# Patient Record
Sex: FEMALE | Race: WHITE | HISPANIC OR LATINO
[De-identification: names, ages, dates, MRNs, and addresses within clinical notes are randomized per-mention and may not be internally consistent; named-entity substitution may affect disease eponyms.]

---

## 2017-05-03 ENCOUNTER — APPOINTMENT (OUTPATIENT)
Dept: FAMILY MEDICINE | Facility: HOSPITAL | Age: 37
End: 2017-05-03

## 2017-05-03 ENCOUNTER — RESULT CHARGE (OUTPATIENT)
Age: 37
End: 2017-05-03

## 2017-05-03 ENCOUNTER — OUTPATIENT (OUTPATIENT)
Dept: OUTPATIENT SERVICES | Facility: HOSPITAL | Age: 37
LOS: 1 days | End: 2017-05-03
Payer: SELF-PAY

## 2017-05-03 VITALS
TEMPERATURE: 98.1 F | DIASTOLIC BLOOD PRESSURE: 88 MMHG | RESPIRATION RATE: 14 BRPM | OXYGEN SATURATION: 100 % | HEIGHT: 61 IN | WEIGHT: 156 LBS | HEART RATE: 73 BPM | SYSTOLIC BLOOD PRESSURE: 129 MMHG | BODY MASS INDEX: 29.45 KG/M2

## 2017-05-03 DIAGNOSIS — Z00.00 ENCOUNTER FOR GENERAL ADULT MEDICAL EXAMINATION W/OUT ABNORMAL FINDINGS: ICD-10-CM

## 2017-05-03 DIAGNOSIS — E66.3 OVERWEIGHT: ICD-10-CM

## 2017-05-03 LAB
BILIRUB UR QL STRIP: NORMAL
CLARITY UR: CLEAR
COLLECTION METHOD: NORMAL
GLUCOSE UR-MCNC: NORMAL
HCG UR QL: 0.2 EU/DL
HGB UR QL STRIP.AUTO: NORMAL
KETONES UR-MCNC: NORMAL
LEUKOCYTE ESTERASE UR QL STRIP: NORMAL
NITRITE UR QL STRIP: NORMAL
PH UR STRIP: 6.5
PROT UR STRIP-MCNC: NORMAL
SP GR UR STRIP: 1.01

## 2017-05-06 PROCEDURE — 80061 LIPID PANEL: CPT

## 2017-05-06 PROCEDURE — 36415 COLL VENOUS BLD VENIPUNCTURE: CPT

## 2017-05-06 PROCEDURE — 85025 COMPLETE CBC W/AUTO DIFF WBC: CPT

## 2017-05-06 PROCEDURE — 83036 HEMOGLOBIN GLYCOSYLATED A1C: CPT

## 2017-05-06 PROCEDURE — G0463: CPT

## 2017-05-06 PROCEDURE — 80053 COMPREHEN METABOLIC PANEL: CPT

## 2017-05-20 ENCOUNTER — APPOINTMENT (OUTPATIENT)
Dept: FAMILY MEDICINE | Facility: HOSPITAL | Age: 37
End: 2017-05-20

## 2017-05-20 ENCOUNTER — OUTPATIENT (OUTPATIENT)
Dept: OUTPATIENT SERVICES | Facility: HOSPITAL | Age: 37
LOS: 1 days | End: 2017-05-20
Payer: SELF-PAY

## 2017-05-20 VITALS
WEIGHT: 156 LBS | TEMPERATURE: 97.8 F | SYSTOLIC BLOOD PRESSURE: 112 MMHG | BODY MASS INDEX: 29.45 KG/M2 | DIASTOLIC BLOOD PRESSURE: 88 MMHG | RESPIRATION RATE: 14 BRPM | HEART RATE: 96 BPM | OXYGEN SATURATION: 100 % | HEIGHT: 61 IN

## 2017-05-20 DIAGNOSIS — Z01.419 ENCOUNTER FOR GYNECOLOGICAL EXAMINATION (GENERAL) (ROUTINE) W/OUT ABNORMAL FINDINGS: ICD-10-CM

## 2017-05-20 PROCEDURE — G0101: CPT

## 2017-05-20 PROCEDURE — 87491 CHLMYD TRACH DNA AMP PROBE: CPT

## 2017-05-20 PROCEDURE — 87563 M. GENITALIUM AMP PROBE: CPT

## 2017-05-20 PROCEDURE — 87591 N.GONORRHOEAE DNA AMP PROB: CPT

## 2017-05-20 PROCEDURE — G0463: CPT

## 2017-05-20 PROCEDURE — 87798 DETECT AGENT NOS DNA AMP: CPT

## 2017-05-20 PROCEDURE — 87801 DETECT AGNT MULT DNA AMPLI: CPT

## 2019-06-05 ENCOUNTER — RESULT REVIEW (OUTPATIENT)
Age: 39
End: 2019-06-05

## 2019-06-17 ENCOUNTER — RESULT REVIEW (OUTPATIENT)
Age: 39
End: 2019-06-17

## 2019-07-08 ENCOUNTER — APPOINTMENT (OUTPATIENT)
Dept: ULTRASOUND IMAGING | Facility: HOSPITAL | Age: 39
End: 2019-07-08
Payer: COMMERCIAL

## 2019-07-08 ENCOUNTER — APPOINTMENT (OUTPATIENT)
Dept: MAMMOGRAPHY | Facility: HOSPITAL | Age: 39
End: 2019-07-08
Payer: COMMERCIAL

## 2019-07-08 ENCOUNTER — OUTPATIENT (OUTPATIENT)
Dept: OUTPATIENT SERVICES | Facility: HOSPITAL | Age: 39
LOS: 1 days | End: 2019-07-08
Payer: COMMERCIAL

## 2019-07-08 DIAGNOSIS — Z12.31 ENCOUNTER FOR SCREENING MAMMOGRAM FOR MALIGNANT NEOPLASM OF BREAST: ICD-10-CM

## 2019-07-08 DIAGNOSIS — Z00.8 ENCOUNTER FOR OTHER GENERAL EXAMINATION: ICD-10-CM

## 2019-07-08 PROCEDURE — 77066 DX MAMMO INCL CAD BI: CPT

## 2019-07-08 PROCEDURE — 76830 TRANSVAGINAL US NON-OB: CPT

## 2019-07-08 PROCEDURE — 76856 US EXAM PELVIC COMPLETE: CPT | Mod: 26

## 2019-07-08 PROCEDURE — 76830 TRANSVAGINAL US NON-OB: CPT | Mod: 26

## 2019-07-08 PROCEDURE — 76856 US EXAM PELVIC COMPLETE: CPT

## 2019-07-08 PROCEDURE — 76641 ULTRASOUND BREAST COMPLETE: CPT

## 2019-07-08 PROCEDURE — 77066 DX MAMMO INCL CAD BI: CPT | Mod: 26,GG

## 2019-07-08 PROCEDURE — 76641 ULTRASOUND BREAST COMPLETE: CPT | Mod: 26,50

## 2019-07-08 PROCEDURE — 77063 BREAST TOMOSYNTHESIS BI: CPT | Mod: 26,59

## 2019-07-08 PROCEDURE — 77067 SCR MAMMO BI INCL CAD: CPT | Mod: 26,59

## 2019-07-08 PROCEDURE — 77063 BREAST TOMOSYNTHESIS BI: CPT

## 2019-07-08 PROCEDURE — 77067 SCR MAMMO BI INCL CAD: CPT

## 2019-08-26 ENCOUNTER — OUTPATIENT (OUTPATIENT)
Dept: OUTPATIENT SERVICES | Facility: HOSPITAL | Age: 39
LOS: 1 days | End: 2019-08-26
Payer: COMMERCIAL

## 2019-08-26 ENCOUNTER — APPOINTMENT (OUTPATIENT)
Dept: ULTRASOUND IMAGING | Facility: HOSPITAL | Age: 39
End: 2019-08-26
Payer: COMMERCIAL

## 2019-08-26 DIAGNOSIS — Z00.8 ENCOUNTER FOR OTHER GENERAL EXAMINATION: ICD-10-CM

## 2019-08-26 PROCEDURE — 76830 TRANSVAGINAL US NON-OB: CPT | Mod: 26

## 2019-08-26 PROCEDURE — 76856 US EXAM PELVIC COMPLETE: CPT

## 2019-08-26 PROCEDURE — 76830 TRANSVAGINAL US NON-OB: CPT

## 2019-08-26 PROCEDURE — 76856 US EXAM PELVIC COMPLETE: CPT | Mod: 26

## 2019-10-24 ENCOUNTER — OUTPATIENT (OUTPATIENT)
Dept: OUTPATIENT SERVICES | Facility: HOSPITAL | Age: 39
LOS: 1 days | End: 2019-10-24
Payer: COMMERCIAL

## 2019-10-24 ENCOUNTER — APPOINTMENT (OUTPATIENT)
Dept: RADIOLOGY | Facility: HOSPITAL | Age: 39
End: 2019-10-24
Payer: COMMERCIAL

## 2019-10-24 DIAGNOSIS — Z92.89 PERSONAL HISTORY OF OTHER MEDICAL TREATMENT: ICD-10-CM

## 2019-10-24 PROCEDURE — 71046 X-RAY EXAM CHEST 2 VIEWS: CPT

## 2019-10-24 PROCEDURE — 71046 X-RAY EXAM CHEST 2 VIEWS: CPT | Mod: 26

## 2020-01-14 ENCOUNTER — APPOINTMENT (OUTPATIENT)
Dept: ULTRASOUND IMAGING | Facility: HOSPITAL | Age: 40
End: 2020-01-14
Payer: COMMERCIAL

## 2020-01-14 ENCOUNTER — OUTPATIENT (OUTPATIENT)
Dept: OUTPATIENT SERVICES | Facility: HOSPITAL | Age: 40
LOS: 1 days | End: 2020-01-14
Payer: COMMERCIAL

## 2020-01-14 DIAGNOSIS — Z00.8 ENCOUNTER FOR OTHER GENERAL EXAMINATION: ICD-10-CM

## 2020-01-14 PROCEDURE — 76830 TRANSVAGINAL US NON-OB: CPT | Mod: 26

## 2020-01-14 PROCEDURE — 76641 ULTRASOUND BREAST COMPLETE: CPT | Mod: 26,50

## 2020-01-14 PROCEDURE — 76856 US EXAM PELVIC COMPLETE: CPT

## 2020-01-14 PROCEDURE — 76641 ULTRASOUND BREAST COMPLETE: CPT

## 2020-01-14 PROCEDURE — 76856 US EXAM PELVIC COMPLETE: CPT | Mod: 26

## 2020-01-14 PROCEDURE — 76830 TRANSVAGINAL US NON-OB: CPT

## 2020-08-11 ENCOUNTER — APPOINTMENT (OUTPATIENT)
Dept: MAMMOGRAPHY | Facility: HOSPITAL | Age: 40
End: 2020-08-11
Payer: COMMERCIAL

## 2020-08-11 ENCOUNTER — APPOINTMENT (OUTPATIENT)
Dept: ULTRASOUND IMAGING | Facility: HOSPITAL | Age: 40
End: 2020-08-11
Payer: COMMERCIAL

## 2020-08-11 ENCOUNTER — OUTPATIENT (OUTPATIENT)
Dept: OUTPATIENT SERVICES | Facility: HOSPITAL | Age: 40
LOS: 1 days | End: 2020-08-11
Payer: COMMERCIAL

## 2020-08-11 DIAGNOSIS — Z00.8 ENCOUNTER FOR OTHER GENERAL EXAMINATION: ICD-10-CM

## 2020-08-11 PROCEDURE — 77063 BREAST TOMOSYNTHESIS BI: CPT | Mod: 26

## 2020-08-11 PROCEDURE — 77067 SCR MAMMO BI INCL CAD: CPT | Mod: 26

## 2020-08-11 PROCEDURE — 76641 ULTRASOUND BREAST COMPLETE: CPT | Mod: 26,50

## 2020-08-11 PROCEDURE — 77063 BREAST TOMOSYNTHESIS BI: CPT

## 2020-08-11 PROCEDURE — 76641 ULTRASOUND BREAST COMPLETE: CPT

## 2020-08-11 PROCEDURE — 77067 SCR MAMMO BI INCL CAD: CPT

## 2021-02-05 ENCOUNTER — APPOINTMENT (OUTPATIENT)
Dept: NEUROLOGY | Facility: CLINIC | Age: 41
End: 2021-02-05
Payer: COMMERCIAL

## 2021-02-05 VITALS
SYSTOLIC BLOOD PRESSURE: 108 MMHG | BODY MASS INDEX: 29.45 KG/M2 | DIASTOLIC BLOOD PRESSURE: 72 MMHG | HEART RATE: 100 BPM | TEMPERATURE: 97.2 F | OXYGEN SATURATION: 98 % | HEIGHT: 61 IN | WEIGHT: 156 LBS

## 2021-02-05 DIAGNOSIS — G43.909 MIGRAINE, UNSPECIFIED, NOT INTRACTABLE, W/OUT STATUS MIGRAINOSUS: ICD-10-CM

## 2021-02-05 PROCEDURE — 99203 OFFICE O/P NEW LOW 30 MIN: CPT

## 2021-02-05 PROCEDURE — 99072 ADDL SUPL MATRL&STAF TM PHE: CPT

## 2021-02-05 NOTE — HISTORY OF PRESENT ILLNESS
[FreeTextEntry1] : : [id number]\par \par Sabrina Jordan is a 40 year old F with history of migraines. \par She presents for initial visit for headaches, referred by her PCP, Dr. Wilkins.\par \par She was in her late 30s when she started having headaches. She can tell when she is going to have a headache because she feels pain around her eyes and on the side of her head, alternates between left and right.\par She reports sensitivity to light when she has headaches. No sensitivity to sound. Sometimes she gets nausea associated. Sometimes the headaches last 1-2 days. She is using Rizatriptan, which does help with her headaches, started two months ago. Since then she has had about 5-6 headaches that last 1-2 days. Before Rizatriptan she was getting headaches 2-3 times a week. Now it's 1-2 times a week. Sometimes she requires two of the tablets. This week she has not had a headache. Last week she used it once. \par \par She reports that she is well controlled and she is happy with her control. \par \par She denies changes in her vision. No changes in speech or swallowing. No numbness/tingling. No weakness. No trouble walking. No trouble with bowel/bladder function. No dizziness/lightheadedness, no LOC.\par \par Family history: unremarkable for neurologic problems\par Social history: she is working, she exercises once or twice a week, no alcohol, no cigarettes\par \par MRI of the brain with and without contrast showed T2 FLAIR hyperintensities likely related to vasospastic phenomenon based on report.

## 2021-02-05 NOTE — DISCUSSION/SUMMARY
[FreeTextEntry1] : Sabrina Jordan is a 40 year old F with history of migraines. \par She presents for initial visit for headaches, referred by her PCP, Dr. Wilkins.\par \par The patient's history is consistent with a diagnosis of Migraines. She is happy with Rizatriptan's control of her headaches. No migraine prophylaxis is recommended at this time. It was discussed with the patient that if her migraines worsen in frequency or intensity, then prophylaxis would be recommended. \par \par All questions were answered to her satisfaction. She can follow up as needed for headache management.

## 2021-02-05 NOTE — PHYSICAL EXAM
[Person] : oriented to person [Place] : oriented to place [Time] : oriented to time [Concentration Intact] : normal concentrating ability [Comprehension] : comprehension intact [Cranial Nerves Optic (II)] : visual acuity intact bilaterally,  visual fields full to confrontation, pupils equal round and reactive to light [Cranial Nerves Oculomotor (III)] : extraocular motion intact [Cranial Nerves Trigeminal (V)] : facial sensation intact symmetrically [Cranial Nerves Facial (VII)] : face symmetrical [Cranial Nerves Vestibulocochlear (VIII)] : hearing was intact bilaterally [Cranial Nerves Glossopharyngeal (IX)] : tongue and palate midline [Cranial Nerves Accessory (XI - Cranial And Spinal)] : head turning and shoulder shrug symmetric [Cranial Nerves Hypoglossal (XII)] : there was no tongue deviation with protrusion [Motor Strength] : muscle strength was normal in all four extremities [Involuntary Movements] : no involuntary movements were seen [Sensation Tactile Decrease] : light touch was intact [Romberg's Sign] : a positive Romberg's sign was present [Abnormal Walk] : normal gait [Balance] : balance was intact [1+] : Patella left 1+ [2+] : Ankle jerk left 2+ [Paresis Pronator Drift Right-Sided] : no pronator drift on the right [Paresis Pronator Drift Left-Sided] : no pronator drift on the left [Coordination - Dysmetria Impaired Finger-to-Nose Bilateral] : not present [Coordination - Dysmetria Impaired Heel-to-Shin Bilateral] : not present

## 2021-02-20 ENCOUNTER — TRANSCRIPTION ENCOUNTER (OUTPATIENT)
Age: 41
End: 2021-02-20

## 2021-08-31 ENCOUNTER — RESULT REVIEW (OUTPATIENT)
Age: 41
End: 2021-08-31

## 2021-09-30 ENCOUNTER — APPOINTMENT (OUTPATIENT)
Dept: ULTRASOUND IMAGING | Facility: HOSPITAL | Age: 41
End: 2021-09-30
Payer: COMMERCIAL

## 2021-09-30 ENCOUNTER — APPOINTMENT (OUTPATIENT)
Dept: MAMMOGRAPHY | Facility: HOSPITAL | Age: 41
End: 2021-09-30
Payer: COMMERCIAL

## 2021-09-30 ENCOUNTER — OUTPATIENT (OUTPATIENT)
Dept: OUTPATIENT SERVICES | Facility: HOSPITAL | Age: 41
LOS: 1 days | End: 2021-09-30
Payer: COMMERCIAL

## 2021-09-30 DIAGNOSIS — N92.0 EXCESSIVE AND FREQUENT MENSTRUATION WITH REGULAR CYCLE: ICD-10-CM

## 2021-09-30 PROCEDURE — 76830 TRANSVAGINAL US NON-OB: CPT

## 2021-09-30 PROCEDURE — 76641 ULTRASOUND BREAST COMPLETE: CPT | Mod: 26,50

## 2021-09-30 PROCEDURE — 77067 SCR MAMMO BI INCL CAD: CPT

## 2021-09-30 PROCEDURE — 77067 SCR MAMMO BI INCL CAD: CPT | Mod: 26

## 2021-09-30 PROCEDURE — 77063 BREAST TOMOSYNTHESIS BI: CPT | Mod: 26

## 2021-09-30 PROCEDURE — 76856 US EXAM PELVIC COMPLETE: CPT | Mod: 26

## 2021-09-30 PROCEDURE — 76830 TRANSVAGINAL US NON-OB: CPT | Mod: 26

## 2021-09-30 PROCEDURE — 76641 ULTRASOUND BREAST COMPLETE: CPT

## 2021-09-30 PROCEDURE — 76856 US EXAM PELVIC COMPLETE: CPT

## 2021-09-30 PROCEDURE — 77063 BREAST TOMOSYNTHESIS BI: CPT

## 2022-09-08 ENCOUNTER — RESULT REVIEW (OUTPATIENT)
Age: 42
End: 2022-09-08

## 2022-10-26 ENCOUNTER — APPOINTMENT (OUTPATIENT)
Dept: MAMMOGRAPHY | Facility: HOSPITAL | Age: 42
End: 2022-10-26

## 2022-10-26 ENCOUNTER — APPOINTMENT (OUTPATIENT)
Dept: ULTRASOUND IMAGING | Facility: HOSPITAL | Age: 42
End: 2022-10-26

## 2022-10-26 ENCOUNTER — OUTPATIENT (OUTPATIENT)
Dept: OUTPATIENT SERVICES | Facility: HOSPITAL | Age: 42
LOS: 1 days | End: 2022-10-26
Payer: COMMERCIAL

## 2022-10-26 DIAGNOSIS — Z00.8 ENCOUNTER FOR OTHER GENERAL EXAMINATION: ICD-10-CM

## 2022-10-26 PROCEDURE — 77063 BREAST TOMOSYNTHESIS BI: CPT

## 2022-10-26 PROCEDURE — 76830 TRANSVAGINAL US NON-OB: CPT

## 2022-10-26 PROCEDURE — 77067 SCR MAMMO BI INCL CAD: CPT | Mod: 26

## 2022-10-26 PROCEDURE — 76830 TRANSVAGINAL US NON-OB: CPT | Mod: 26

## 2022-10-26 PROCEDURE — 76641 ULTRASOUND BREAST COMPLETE: CPT

## 2022-10-26 PROCEDURE — 76641 ULTRASOUND BREAST COMPLETE: CPT | Mod: 26,50

## 2022-10-26 PROCEDURE — 76856 US EXAM PELVIC COMPLETE: CPT | Mod: 26

## 2022-10-26 PROCEDURE — 76856 US EXAM PELVIC COMPLETE: CPT

## 2022-10-26 PROCEDURE — 77063 BREAST TOMOSYNTHESIS BI: CPT | Mod: 26

## 2022-10-26 PROCEDURE — 77067 SCR MAMMO BI INCL CAD: CPT

## 2023-02-01 ENCOUNTER — NON-APPOINTMENT (OUTPATIENT)
Age: 43
End: 2023-02-01

## 2023-02-01 DIAGNOSIS — Z98.890 OTHER SPECIFIED POSTPROCEDURAL STATES: ICD-10-CM

## 2023-02-01 DIAGNOSIS — Z87.440 PERSONAL HISTORY OF URINARY (TRACT) INFECTIONS: ICD-10-CM

## 2023-02-01 DIAGNOSIS — N83.201 UNSPECIFIED OVARIAN CYST, RIGHT SIDE: ICD-10-CM

## 2023-02-01 DIAGNOSIS — Z92.89 PERSONAL HISTORY OF OTHER MEDICAL TREATMENT: ICD-10-CM

## 2023-02-01 DIAGNOSIS — Z87.42 PERSONAL HISTORY OF OTHER DISEASES OF THE FEMALE GENITAL TRACT: ICD-10-CM

## 2023-02-01 DIAGNOSIS — Z78.9 OTHER SPECIFIED HEALTH STATUS: ICD-10-CM

## 2023-02-01 DIAGNOSIS — N84.0 POLYP OF CORPUS UTERI: ICD-10-CM

## 2023-02-01 DIAGNOSIS — R92.2 INCONCLUSIVE MAMMOGRAM: ICD-10-CM

## 2023-02-01 RX ORDER — ASCORBIC ACID 500 MG
TABLET ORAL
Refills: 0 | Status: ACTIVE | COMMUNITY

## 2023-02-01 RX ORDER — PSYLLIUM HUSK 0.4 G
CAPSULE ORAL
Refills: 0 | Status: ACTIVE | COMMUNITY

## 2024-04-08 ENCOUNTER — APPOINTMENT (OUTPATIENT)
Dept: OBGYN | Facility: CLINIC | Age: 44
End: 2024-04-08
Payer: COMMERCIAL

## 2024-04-08 VITALS
HEIGHT: 61 IN | WEIGHT: 156 LBS | TEMPERATURE: 97.5 F | HEART RATE: 112 BPM | OXYGEN SATURATION: 98 % | BODY MASS INDEX: 29.45 KG/M2 | DIASTOLIC BLOOD PRESSURE: 80 MMHG | SYSTOLIC BLOOD PRESSURE: 126 MMHG

## 2024-04-08 DIAGNOSIS — Z12.11 ENCOUNTER FOR SCREENING FOR MALIGNANT NEOPLASM OF COLON: ICD-10-CM

## 2024-04-08 DIAGNOSIS — Z12.4 ENCOUNTER FOR SCREENING FOR MALIGNANT NEOPLASM OF CERVIX: ICD-10-CM

## 2024-04-08 DIAGNOSIS — N92.1 EXCESSIVE AND FREQUENT MENSTRUATION WITH IRREGULAR CYCLE: ICD-10-CM

## 2024-04-08 DIAGNOSIS — Z01.419 ENCOUNTER FOR GYNECOLOGICAL EXAMINATION (GENERAL) (ROUTINE) W/OUT ABNORMAL FINDINGS: ICD-10-CM

## 2024-04-08 DIAGNOSIS — Z12.39 ENCOUNTER FOR OTHER SCREENING FOR MALIGNANT NEOPLASM OF BREAST: ICD-10-CM

## 2024-04-08 PROCEDURE — 99386 PREV VISIT NEW AGE 40-64: CPT

## 2024-04-08 PROCEDURE — 81025 URINE PREGNANCY TEST: CPT

## 2024-04-10 LAB
CANDIDA VAG CYTO: NOT DETECTED
G VAGINALIS+PREV SP MTYP VAG QL MICRO: NOT DETECTED
HPV 16 E6+E7 MRNA CVX QL NAA+PROBE: NOT DETECTED
HPV HIGH+LOW RISK DNA PNL CVX: NOT DETECTED
HPV18+45 E6+E7 MRNA CVX QL NAA+PROBE: NOT DETECTED
T VAGINALIS VAG QL WET PREP: NOT DETECTED

## 2024-04-13 LAB — CYTOLOGY CVX/VAG DOC THIN PREP: NORMAL

## 2024-04-13 NOTE — PHYSICAL EXAM
[Chaperone Present] : A chaperone was present in the examining room during all aspects of the physical examination [Appropriately responsive] : appropriately responsive [Alert] : alert [No Acute Distress] : no acute distress [No Lymphadenopathy] : no lymphadenopathy [Clear to Auscultation B/L] : clear to auscultation bilaterally [Soft] : soft [Non-tender] : non-tender [Non-distended] : non-distended [No HSM] : No HSM [No Lesions] : no lesions [No Mass] : no mass [Oriented x3] : oriented x3 [Examination Of The Breasts] : a normal appearance [No Discharge] : no discharge [No Masses] : no breast masses were palpable [Labia Majora] : normal [Labia Minora] : normal [No Bleeding] : There was no active vaginal bleeding [Normal] : normal [Uterine Adnexae] : normal

## 2024-04-13 NOTE — HISTORY OF PRESENT ILLNESS
[Abnormal Duration] : abnormal duration [Heavy Bleeding] : heavy bleeding [FreeTextEntry1] : 43 yo  LMP 3/19/24 presents for annual c/o prolonged menses for the last year [Patient reported PAP Smear was normal] : Patient reported PAP Smear was normal [Y] : Patient is sexually active [PapSmeardate] : 09/22 [PGHxTotal] : 2 [[facility] : 2 [No] : Patient does not have concerns regarding sex [Currently Active] : currently active

## 2024-04-30 ENCOUNTER — APPOINTMENT (OUTPATIENT)
Dept: MAMMOGRAPHY | Facility: HOSPITAL | Age: 44
End: 2024-04-30
Payer: COMMERCIAL

## 2024-04-30 ENCOUNTER — RESULT REVIEW (OUTPATIENT)
Age: 44
End: 2024-04-30

## 2024-04-30 ENCOUNTER — APPOINTMENT (OUTPATIENT)
Dept: ULTRASOUND IMAGING | Facility: HOSPITAL | Age: 44
End: 2024-04-30
Payer: COMMERCIAL

## 2024-04-30 ENCOUNTER — OUTPATIENT (OUTPATIENT)
Dept: OUTPATIENT SERVICES | Facility: HOSPITAL | Age: 44
LOS: 1 days | End: 2024-04-30
Payer: COMMERCIAL

## 2024-04-30 DIAGNOSIS — N92.1 EXCESSIVE AND FREQUENT MENSTRUATION WITH IRREGULAR CYCLE: ICD-10-CM

## 2024-04-30 DIAGNOSIS — Z12.39 ENCOUNTER FOR OTHER SCREENING FOR MALIGNANT NEOPLASM OF BREAST: ICD-10-CM

## 2024-04-30 PROCEDURE — 76830 TRANSVAGINAL US NON-OB: CPT | Mod: 26

## 2024-04-30 PROCEDURE — 77067 SCR MAMMO BI INCL CAD: CPT | Mod: 26

## 2024-04-30 PROCEDURE — 76641 ULTRASOUND BREAST COMPLETE: CPT

## 2024-04-30 PROCEDURE — 76856 US EXAM PELVIC COMPLETE: CPT | Mod: 26

## 2024-04-30 PROCEDURE — 76641 ULTRASOUND BREAST COMPLETE: CPT | Mod: 26,50

## 2024-04-30 PROCEDURE — 77063 BREAST TOMOSYNTHESIS BI: CPT

## 2024-04-30 PROCEDURE — 77063 BREAST TOMOSYNTHESIS BI: CPT | Mod: 26

## 2024-04-30 PROCEDURE — 77067 SCR MAMMO BI INCL CAD: CPT

## 2024-04-30 PROCEDURE — 76856 US EXAM PELVIC COMPLETE: CPT

## 2024-04-30 PROCEDURE — 76830 TRANSVAGINAL US NON-OB: CPT

## 2024-06-01 ENCOUNTER — EMERGENCY (EMERGENCY)
Facility: HOSPITAL | Age: 44
LOS: 1 days | Discharge: HOME | End: 2024-06-01
Attending: EMERGENCY MEDICINE | Admitting: EMERGENCY MEDICINE
Payer: COMMERCIAL

## 2024-06-01 VITALS
SYSTOLIC BLOOD PRESSURE: 160 MMHG | DIASTOLIC BLOOD PRESSURE: 82 MMHG | HEIGHT: 62 IN | TEMPERATURE: 99 F | WEIGHT: 167.99 LBS | RESPIRATION RATE: 18 BRPM | OXYGEN SATURATION: 100 % | HEART RATE: 106 BPM

## 2024-06-01 LAB
ALBUMIN SERPL ELPH-MCNC: 3.6 G/DL — SIGNIFICANT CHANGE UP (ref 3.3–5)
ALP SERPL-CCNC: 70 U/L — SIGNIFICANT CHANGE UP (ref 40–120)
ALT FLD-CCNC: 27 U/L — SIGNIFICANT CHANGE UP (ref 10–45)
ANION GAP SERPL CALC-SCNC: 11 MMOL/L — SIGNIFICANT CHANGE UP (ref 5–17)
AST SERPL-CCNC: 19 U/L — SIGNIFICANT CHANGE UP (ref 10–40)
BASOPHILS # BLD AUTO: 0.03 K/UL — SIGNIFICANT CHANGE UP (ref 0–0.2)
BASOPHILS NFR BLD AUTO: 0.4 % — SIGNIFICANT CHANGE UP (ref 0–2)
BILIRUB SERPL-MCNC: 0.3 MG/DL — SIGNIFICANT CHANGE UP (ref 0.2–1.2)
BLD GP AB SCN SERPL QL: SIGNIFICANT CHANGE UP
BUN SERPL-MCNC: 18 MG/DL — SIGNIFICANT CHANGE UP (ref 7–23)
CALCIUM SERPL-MCNC: 8.6 MG/DL — SIGNIFICANT CHANGE UP (ref 8.4–10.5)
CHLORIDE SERPL-SCNC: 107 MMOL/L — SIGNIFICANT CHANGE UP (ref 96–108)
CO2 SERPL-SCNC: 23 MMOL/L — SIGNIFICANT CHANGE UP (ref 22–31)
CREAT SERPL-MCNC: 0.9 MG/DL — SIGNIFICANT CHANGE UP (ref 0.5–1.3)
EGFR: 80 ML/MIN/1.73M2 — SIGNIFICANT CHANGE UP
EOSINOPHIL # BLD AUTO: 0.12 K/UL — SIGNIFICANT CHANGE UP (ref 0–0.5)
EOSINOPHIL NFR BLD AUTO: 1.6 % — SIGNIFICANT CHANGE UP (ref 0–6)
GLUCOSE SERPL-MCNC: 110 MG/DL — HIGH (ref 70–99)
HCT VFR BLD CALC: 23.6 % — LOW (ref 34.5–45)
HGB BLD-MCNC: 6.6 G/DL — CRITICAL LOW (ref 11.5–15.5)
IMM GRANULOCYTES NFR BLD AUTO: 0.4 % — SIGNIFICANT CHANGE UP (ref 0–0.9)
LYMPHOCYTES # BLD AUTO: 2.74 K/UL — SIGNIFICANT CHANGE UP (ref 1–3.3)
LYMPHOCYTES # BLD AUTO: 35.7 % — SIGNIFICANT CHANGE UP (ref 13–44)
MCHC RBC-ENTMCNC: 17.5 PG — LOW (ref 27–34)
MCHC RBC-ENTMCNC: 28 GM/DL — LOW (ref 32–36)
MCV RBC AUTO: 62.6 FL — LOW (ref 80–100)
MONOCYTES # BLD AUTO: 0.49 K/UL — SIGNIFICANT CHANGE UP (ref 0–0.9)
MONOCYTES NFR BLD AUTO: 6.4 % — SIGNIFICANT CHANGE UP (ref 2–14)
NEUTROPHILS # BLD AUTO: 4.26 K/UL — SIGNIFICANT CHANGE UP (ref 1.8–7.4)
NEUTROPHILS NFR BLD AUTO: 55.5 % — SIGNIFICANT CHANGE UP (ref 43–77)
NRBC # BLD: 0 /100 WBCS — SIGNIFICANT CHANGE UP (ref 0–0)
PLATELET # BLD AUTO: 257 K/UL — SIGNIFICANT CHANGE UP (ref 150–400)
POTASSIUM SERPL-MCNC: 3.6 MMOL/L — SIGNIFICANT CHANGE UP (ref 3.5–5.3)
POTASSIUM SERPL-SCNC: 3.6 MMOL/L — SIGNIFICANT CHANGE UP (ref 3.5–5.3)
PROT SERPL-MCNC: 8.4 G/DL — HIGH (ref 6–8.3)
RBC # BLD: 3.77 M/UL — LOW (ref 3.8–5.2)
RBC # FLD: 19.9 % — HIGH (ref 10.3–14.5)
SODIUM SERPL-SCNC: 141 MMOL/L — SIGNIFICANT CHANGE UP (ref 135–145)
WBC # BLD: 7.67 K/UL — SIGNIFICANT CHANGE UP (ref 3.8–10.5)
WBC # FLD AUTO: 7.67 K/UL — SIGNIFICANT CHANGE UP (ref 3.8–10.5)

## 2024-06-01 PROCEDURE — 86901 BLOOD TYPING SEROLOGIC RH(D): CPT

## 2024-06-01 PROCEDURE — 85025 COMPLETE CBC W/AUTO DIFF WBC: CPT

## 2024-06-01 PROCEDURE — 36430 TRANSFUSION BLD/BLD COMPNT: CPT

## 2024-06-01 PROCEDURE — 86850 RBC ANTIBODY SCREEN: CPT

## 2024-06-01 PROCEDURE — P9016: CPT

## 2024-06-01 PROCEDURE — 99285 EMERGENCY DEPT VISIT HI MDM: CPT | Mod: 25

## 2024-06-01 PROCEDURE — 36415 COLL VENOUS BLD VENIPUNCTURE: CPT

## 2024-06-01 PROCEDURE — 86923 COMPATIBILITY TEST ELECTRIC: CPT

## 2024-06-01 PROCEDURE — 86900 BLOOD TYPING SEROLOGIC ABO: CPT

## 2024-06-01 PROCEDURE — 80053 COMPREHEN METABOLIC PANEL: CPT

## 2024-06-01 PROCEDURE — 99285 EMERGENCY DEPT VISIT HI MDM: CPT

## 2024-06-01 RX ORDER — FERROUS GLUCONATE 100 %
1 POWDER (GRAM) MISCELLANEOUS
Qty: 30 | Refills: 0
Start: 2024-06-01 | End: 2024-06-30

## 2024-06-01 NOTE — ED PROVIDER NOTE - CLINICAL SUMMARY MEDICAL DECISION MAKING FREE TEXT BOX
44-year-old female presenting with outpatient labs showing anemia.  It is symptomatic in nature as she does feel shortness of breath on exertion 1 week and chest pressure.  This being the case I will give her 1 unit of packed red blood cells here in the emergency department.  I typically do not give iron infusion same time that on giving packed red blood cells.  I will start the patient on oral supplementation.  I discussed with her primary care physician and ferrous gluconate will be the choice.  She can follow-up as an outpatient with him and he will make sure she sees a GYN with regards to the bleeding.

## 2024-06-01 NOTE — ED PROVIDER NOTE - PHYSICAL EXAMINATION
Vitals: I have reviewed the patients vital signs  General: nontoxic appearing  HEENT: Atraumatic, normocephalic, airway patent  Eyes: EOMI, tracking appropriately conjunctiva are pale  Neck: no tracheal deviation  Chest/Lungs: no trauma, symmetric chest rise, speaking in complete sentences,  no resp distress  Heart: skin and extremities well perfused, regular rate and rhythm  Neuro: A+Ox3, appears non focal  MSK: no deformities  Skin: no cyanosis, no jaundice   Psych:  Normal mood and affect

## 2024-06-01 NOTE — ED PROVIDER NOTE - NSFOLLOWUPINSTRUCTIONS_ED_ALL_ED_FT
Anemia por deficiencia de yakelin en los adultos  Iron Deficiency Anemia, Adult  Comparison of blood with a normal amount of red blood cells to blood with fewer red blood cells when a person has anemia.   La anemia por deficiencia de yakelin es amie afección en la que la concentración de glóbulos rojos o hemoglobina en la quinn está por debajo de lo normal debido a la falta de yakelin. La hemoglobina es la sustancia de los glóbulos rojos que lleva el oxígeno a todos los tejidos del cuerpo. Cuando la concentración de los glóbulos rojos o de la hemoglobina es demasiado baja, no llega suficiente oxígeno a estos tejidos.    La anemia por deficiencia de yakelin, generalmente, es de larga duración y se desarrolla con el tiempo. Puede o no causar síntomas. Rafael es un tipo común de anemia.    ¿Cuáles son las causas?  Esta afección puede ser causada por lo siguiente:  Insuficiencia de yakelin en la alimentación.  Absorción intestinal anormal.  Pérdida de quinn.  ¿Qué incrementa el riesgo?  Es más probable que desarrolle esta afección si tiene períodos menstruales (menstrúa) o está embarazada.    ¿Cuáles son los signos o síntomas?  Los síntomas de esta afección pueden incluir los siguientes:  Piel, labios y uñas pálidos.  Debilidad, mareos y cansarse con facilidad.  Falta de aire al moverse o al hacer ejercicio.  Tona o pies fríos.  Los casos leves de anemia podrían no causar síntomas.    ¿Cómo se diagnostica?  Esta afección se diagnostica en función de lo siguiente:  Tarah antecedentes médicos.  Un examen físico.  Análisis de quinn.  ¿Cómo se trata?  Esta afección se trata remediando la causa de la deficiencia de yakelin. El tratamiento puede implicar lo siguiente:  Agregarle a la dieta alimentos ricos en yakelin.  Dex suplementos de yakelin. Si está embarazada o amamantando, es posible que deba dex amie dosis adicional de yakelin, ya que weeks dieta normal generalmente no proporciona la cantidad necesaria.  Aumentar la ingesta de vitamina C. La vitamina C ayuda al organismo a absorber el yakelin. Es posible que el médico le recomiende dex los suplementos de yakelin con un vaso de jugo de naranja o con un suplemento de vitamina C.  Medicamentos para disminuir el flujo menstrual abundante.  Cirugía o procedimientos de prueba adicionales para determinar la causa de la anemia.  Es posible que deba realizarse varios análisis de quinn para determinar si el tratamiento da resultado. Si el tratamiento no funciona, es posible que deba realizarse otras pruebas.    Siga estas instrucciones en weeks casa:  Medicamentos    Use los medicamentos de venta zonia y los recetados solamente suni se lo haya indicado el médico. Gypsum incluye suplementos de yakelin y vitaminas. Gypsum es importante, ya que un exceso de yakelin puede ser perjudicial.  Para lograr amie mejor absorción del yakelin, los suplementos de yakelin se deben dex con el estómago vacío. Si no los tolera con el estómago vacío, posiblemente deba tomarlos con la comida.  No amy leche ni tome antiácidos al mismo tiempo que los suplementos de yakelin. La leche y los antiácidos pueden interferir en la manera en que el organismo absorbe el yakelin.  Los suplementos de yakelin pueden hacer que la materia fecal (heces) se torne de un color más oscuro y puede ponerse michael.  Si no tolera dex los suplementos de yakelin por boca, hable con weeks médico sobre la posibilidad de administrarlos por amie vía intravenosa o mediante amie inyección en un músculo.  Comida y bebida    Hable con el médico antes de cambiar la dieta. El médico podría recomendarle que consuma alimentos que contienen mucho yakelin; por ejemplo:  Hígado.  Carne de res con bajo contenido de grasa (magra).  Panes y cereales con yakelin agregado (fortificados).  Huevos.  Frutas pasas.  Verduras de hojas color enrrique oscuro.  Para ayudar al organismo a aprovechar el yakelin de los alimentos con alto contenido de yakelin, consuma dichos alimentos al mismo tiempo que frutas y verduras frescas que tengan mucha vitamina C. Algunos alimentos ricos en vitamina C son los siguientes:  Naranjas.  Pimientos.  Tomates.  Mangos.  Control del estreñimiento    Si lilia un suplemento de yakelin, rafael puede causarle estreñimiento. Para prevenir o tratar el estreñimiento, es posible que deba hacer lo siguiente:  Beber suficiente líquido suni para mantener la orina de color amarillo pálido.  Usar medicamentos recetados o de venta zonia.  Consumir alimentos ricos en fibra, suni frijoles, cereales integrales, y frutas y verduras frescas.  Limitar el consumo de alimentos ricos en grasa y azúcares procesados, suni los alimentos fritos o dulces.  Instrucciones generales    Retome tarah actividades normales según lo indicado por el médico. Pregúntele al médico qué actividades son seguras para usted.  Concurra a todas las visitas de seguimiento.  Comuníquese con un médico si:  Siente náuseas o vomita.  Se siente débil.  Se siente mareado cuando se para después de estar sentado o acostado.  Comienza a sudar sin motivo.  Presenta síntomas de estreñimiento.  Siente pesadez en el pecho.  Tiene dificultad para respirar cuando hace actividad física.  Solicite ayuda de inmediato si:  Se desmaya. Si esto sucede, no conduzca por tarah propios medios hasta el hospital.  Tiene latidos cardíacos irregulares o rápidos.  Resumen  La anemia por deficiencia de yakelin es amie afección en la que la concentración de glóbulos rojos o hemoglobina en la quinn está por debajo de lo normal debido a la falta de yakelin.  Esta afección se trata remediando la causa de la deficiencia de yakelin.  Use los medicamentos de venta zonia y los recetados solamente suni se lo haya indicado el médico. Gypsum incluye suplementos de yakelin y vitaminas.  Para ayudar al organismo a aprovechar el yakelin de los alimentos con alto contenido de yakelin, consuma dichos alimentos al mismo tiempo que frutas y verduras frescas que tengan mucha vitamina C.  Busque ayuda médica si tiene signos o síntomas de empeoramiento de la anemia.

## 2024-06-01 NOTE — ED PROVIDER NOTE - PROGRESS NOTE DETAILS
Transfusion is complete at this time.  I will discharge the patient home to follow-up with her primary care doctor on Monday the prescription for iron is ready been sent to the pharmacy

## 2024-06-01 NOTE — ED PROVIDER NOTE - PATIENT PORTAL LINK FT
You can access the FollowMyHealth Patient Portal offered by [facility] by registering at the following website: [email or web address]. By joining [facility]’s FollowMyHealth portal, you will also be able to view your health information using other applications (apps) compatible with our system.

## 2024-06-01 NOTE — ED ADULT TRIAGE NOTE - CHIEF COMPLAINT QUOTE
Patient sent by Dr. Wilkins for abnormal lab result of high iron level 6.5 and was sent to receive iron infusion. Patient also states to have chest pain for the past two months, states to feel her heart racing.

## 2024-06-01 NOTE — ED ADULT NURSE NOTE - NSFALLUNIVINTERV_ED_ALL_ED
Bed/Stretcher in lowest position, wheels locked, appropriate side rails in place/Call bell, personal items and telephone in reach/Instruct patient to call for assistance before getting out of bed/chair/stretcher/Non-slip footwear applied when patient is off stretcher/[place] to call system/Physically safe environment - no spills, clutter or unnecessary equipment/Purposeful proactive rounding/Room/bathroom lighting operational, light cord in reach

## 2024-06-01 NOTE — ED ADULT NURSE NOTE - HISTORY OF COVID-19 VACCINATION
Consult called in for Dr. Rubio. JEFERSON Charlton at service
Sandra FOOTE
Sandra FOOTE
Vaccine status unknown

## 2024-06-01 NOTE — ED ADULT NURSE NOTE - OBJECTIVE STATEMENT
Pt A&Ox4 came in from home due to abnormal lab results of a hemoglobin 6.8. Pt is symtomatic c/o feeling overall weak. Pt also c/o SOB and chest pressure while walking. Denies black stools/ foul smelling stool. Pt states her periods have been heavier and longer than usual.

## 2024-06-01 NOTE — ED PROVIDER NOTE - OBJECTIVE STATEMENT
44-year-old female presenting emerged from today with outpatient labs showing significant anemia.  States that for the last couple of months has been feeling overall weak and tired.  Also reporting some dyspnea on exertion and chest pressure with walking up steps.  Denies any change in her stool color or smell.  Does state that her periods have been much heavier over the last year.  The used to be 3 days now they are 7.

## 2024-06-02 VITALS
HEART RATE: 80 BPM | OXYGEN SATURATION: 100 % | DIASTOLIC BLOOD PRESSURE: 64 MMHG | RESPIRATION RATE: 19 BRPM | SYSTOLIC BLOOD PRESSURE: 120 MMHG | TEMPERATURE: 98 F

## 2024-06-11 PROBLEM — Z78.9 OTHER SPECIFIED HEALTH STATUS: Chronic | Status: ACTIVE | Noted: 2024-06-01

## 2024-06-12 ENCOUNTER — APPOINTMENT (OUTPATIENT)
Dept: OBGYN | Facility: CLINIC | Age: 44
End: 2024-06-12

## 2024-06-12 VITALS
TEMPERATURE: 97.5 F | OXYGEN SATURATION: 99 % | SYSTOLIC BLOOD PRESSURE: 118 MMHG | HEART RATE: 109 BPM | WEIGHT: 156 LBS | DIASTOLIC BLOOD PRESSURE: 76 MMHG | BODY MASS INDEX: 29.45 KG/M2 | HEIGHT: 61 IN

## 2024-06-12 PROCEDURE — 99213 OFFICE O/P EST LOW 20 MIN: CPT

## 2024-06-12 RX ORDER — NORETHINDRONE ACETATE 5 MG/1
5 TABLET ORAL
Qty: 30 | Refills: 0 | Status: ACTIVE | COMMUNITY
Start: 2024-06-12 | End: 1900-01-01

## 2024-06-13 LAB
HCG UR QL: NEGATIVE
QUALITY CONTROL: YES

## 2024-06-17 ENCOUNTER — APPOINTMENT (OUTPATIENT)
Dept: OBGYN | Facility: CLINIC | Age: 44
End: 2024-06-17